# Patient Record
Sex: MALE | Race: NATIVE HAWAIIAN OR OTHER PACIFIC ISLANDER | NOT HISPANIC OR LATINO | URBAN - METROPOLITAN AREA
[De-identification: names, ages, dates, MRNs, and addresses within clinical notes are randomized per-mention and may not be internally consistent; named-entity substitution may affect disease eponyms.]

---

## 2024-01-16 ENCOUNTER — EMERGENCY (EMERGENCY)
Facility: HOSPITAL | Age: 24
LOS: 1 days | Discharge: HOME | End: 2024-01-16
Attending: STUDENT IN AN ORGANIZED HEALTH CARE EDUCATION/TRAINING PROGRAM | Admitting: STUDENT IN AN ORGANIZED HEALTH CARE EDUCATION/TRAINING PROGRAM
Payer: COMMERCIAL

## 2024-01-16 VITALS
DIASTOLIC BLOOD PRESSURE: 89 MMHG | SYSTOLIC BLOOD PRESSURE: 153 MMHG | HEART RATE: 97 BPM | WEIGHT: 195.33 LBS | RESPIRATION RATE: 18 BRPM | HEIGHT: 67 IN | TEMPERATURE: 98 F | OXYGEN SATURATION: 97 %

## 2024-01-16 DIAGNOSIS — Z88.6 ALLERGY STATUS TO ANALGESIC AGENT: ICD-10-CM

## 2024-01-16 DIAGNOSIS — H53.8 OTHER VISUAL DISTURBANCES: ICD-10-CM

## 2024-01-16 DIAGNOSIS — H52.209 UNSPECIFIED ASTIGMATISM, UNSPECIFIED EYE: ICD-10-CM

## 2024-01-16 DIAGNOSIS — R51.9 HEADACHE, UNSPECIFIED: ICD-10-CM

## 2024-01-16 DIAGNOSIS — Z20.822 CONTACT WITH AND (SUSPECTED) EXPOSURE TO COVID-19: ICD-10-CM

## 2024-01-16 LAB
ALBUMIN SERPL ELPH-MCNC: 4.6 G/DL — SIGNIFICANT CHANGE UP (ref 3.3–5)
ALP SERPL-CCNC: 79 U/L — SIGNIFICANT CHANGE UP (ref 40–120)
ALT FLD-CCNC: SIGNIFICANT CHANGE UP (ref 10–45)
ANION GAP SERPL CALC-SCNC: 10 MMOL/L — SIGNIFICANT CHANGE UP (ref 5–17)
ANION GAP SERPL CALC-SCNC: 10 MMOL/L — SIGNIFICANT CHANGE UP (ref 5–17)
ANION GAP SERPL CALC-SCNC: 13 MMOL/L — SIGNIFICANT CHANGE UP (ref 5–17)
APPEARANCE UR: CLEAR — SIGNIFICANT CHANGE UP
APTT BLD: 39.9 SEC — HIGH (ref 24.5–35.6)
AST SERPL-CCNC: SIGNIFICANT CHANGE UP (ref 10–40)
BASE EXCESS BLDV CALC-SCNC: 1.2 MMOL/L — SIGNIFICANT CHANGE UP (ref -2–3)
BASOPHILS # BLD AUTO: 0 K/UL — SIGNIFICANT CHANGE UP (ref 0–0.2)
BASOPHILS NFR BLD AUTO: 0 % — SIGNIFICANT CHANGE UP (ref 0–2)
BILIRUB SERPL-MCNC: 0.2 MG/DL — SIGNIFICANT CHANGE UP (ref 0.2–1.2)
BILIRUB UR-MCNC: NEGATIVE — SIGNIFICANT CHANGE UP
BUN SERPL-MCNC: 10 MG/DL — SIGNIFICANT CHANGE UP (ref 7–23)
BUN SERPL-MCNC: 12 MG/DL — SIGNIFICANT CHANGE UP (ref 7–23)
BUN SERPL-MCNC: 9 MG/DL — SIGNIFICANT CHANGE UP (ref 7–23)
CA-I SERPL-SCNC: 1.22 MMOL/L — SIGNIFICANT CHANGE UP (ref 1.15–1.33)
CALCIUM SERPL-MCNC: 8.7 MG/DL — SIGNIFICANT CHANGE UP (ref 8.4–10.5)
CALCIUM SERPL-MCNC: 9.3 MG/DL — SIGNIFICANT CHANGE UP (ref 8.4–10.5)
CALCIUM SERPL-MCNC: 9.5 MG/DL — SIGNIFICANT CHANGE UP (ref 8.4–10.5)
CHLORIDE SERPL-SCNC: 102 MMOL/L — SIGNIFICANT CHANGE UP (ref 96–108)
CHLORIDE SERPL-SCNC: 103 MMOL/L — SIGNIFICANT CHANGE UP (ref 96–108)
CHLORIDE SERPL-SCNC: 104 MMOL/L — SIGNIFICANT CHANGE UP (ref 96–108)
CO2 BLDV-SCNC: 28 MMOL/L — HIGH (ref 22–26)
CO2 SERPL-SCNC: 23 MMOL/L — SIGNIFICANT CHANGE UP (ref 22–31)
CO2 SERPL-SCNC: 23 MMOL/L — SIGNIFICANT CHANGE UP (ref 22–31)
CO2 SERPL-SCNC: 24 MMOL/L — SIGNIFICANT CHANGE UP (ref 22–31)
COLOR SPEC: YELLOW — SIGNIFICANT CHANGE UP
CREAT SERPL-MCNC: 0.84 MG/DL — SIGNIFICANT CHANGE UP (ref 0.5–1.3)
CREAT SERPL-MCNC: 0.87 MG/DL — SIGNIFICANT CHANGE UP (ref 0.5–1.3)
CREAT SERPL-MCNC: 0.89 MG/DL — SIGNIFICANT CHANGE UP (ref 0.5–1.3)
DIFF PNL FLD: NEGATIVE — SIGNIFICANT CHANGE UP
EGFR: 121 ML/MIN/1.73M2 — SIGNIFICANT CHANGE UP
EGFR: 122 ML/MIN/1.73M2 — SIGNIFICANT CHANGE UP
EGFR: 123 ML/MIN/1.73M2 — SIGNIFICANT CHANGE UP
EOSINOPHIL # BLD AUTO: 0.66 K/UL — HIGH (ref 0–0.5)
EOSINOPHIL NFR BLD AUTO: 4.8 % — SIGNIFICANT CHANGE UP (ref 0–6)
GAS PNL BLDV: 137 MMOL/L — SIGNIFICANT CHANGE UP (ref 136–145)
GAS PNL BLDV: SIGNIFICANT CHANGE UP
GAS PNL BLDV: SIGNIFICANT CHANGE UP
GLUCOSE SERPL-MCNC: 101 MG/DL — HIGH (ref 70–99)
GLUCOSE SERPL-MCNC: 103 MG/DL — HIGH (ref 70–99)
GLUCOSE SERPL-MCNC: 93 MG/DL — SIGNIFICANT CHANGE UP (ref 70–99)
GLUCOSE UR QL: NEGATIVE MG/DL — SIGNIFICANT CHANGE UP
HCO3 BLDV-SCNC: 27 MMOL/L — SIGNIFICANT CHANGE UP (ref 22–29)
HCT VFR BLD CALC: 42.6 % — SIGNIFICANT CHANGE UP (ref 39–50)
HGB BLD-MCNC: 15.2 G/DL — SIGNIFICANT CHANGE UP (ref 13–17)
INR BLD: 0.94 — SIGNIFICANT CHANGE UP (ref 0.85–1.18)
KETONES UR-MCNC: NEGATIVE MG/DL — SIGNIFICANT CHANGE UP
LEUKOCYTE ESTERASE UR-ACNC: NEGATIVE — SIGNIFICANT CHANGE UP
LYMPHOCYTES # BLD AUTO: 33 % — SIGNIFICANT CHANGE UP (ref 13–44)
LYMPHOCYTES # BLD AUTO: 4.55 K/UL — HIGH (ref 1–3.3)
MANUAL SMEAR VERIFICATION: SIGNIFICANT CHANGE UP
MCHC RBC-ENTMCNC: 29.9 PG — SIGNIFICANT CHANGE UP (ref 27–34)
MCHC RBC-ENTMCNC: 35.7 GM/DL — SIGNIFICANT CHANGE UP (ref 32–36)
MCV RBC AUTO: 83.7 FL — SIGNIFICANT CHANGE UP (ref 80–100)
MONOCYTES # BLD AUTO: 0 K/UL — SIGNIFICANT CHANGE UP (ref 0–0.9)
MONOCYTES NFR BLD AUTO: 0 % — LOW (ref 2–14)
NEUTROPHILS # BLD AUTO: 7.91 K/UL — HIGH (ref 1.8–7.4)
NEUTROPHILS NFR BLD AUTO: 56.3 % — SIGNIFICANT CHANGE UP (ref 43–77)
NEUTS BAND # BLD: 1 % — SIGNIFICANT CHANGE UP (ref 0–8)
NITRITE UR-MCNC: NEGATIVE — SIGNIFICANT CHANGE UP
OVALOCYTES BLD QL SMEAR: SLIGHT — SIGNIFICANT CHANGE UP
PCO2 BLDV: 44 MMHG — SIGNIFICANT CHANGE UP (ref 42–55)
PH BLDV: 7.39 — SIGNIFICANT CHANGE UP (ref 7.32–7.43)
PH UR: 7.5 — SIGNIFICANT CHANGE UP (ref 5–8)
PLAT MORPH BLD: SIGNIFICANT CHANGE UP
PLATELET # BLD AUTO: 325 K/UL — SIGNIFICANT CHANGE UP (ref 150–400)
PO2 BLDV: 39 MMHG — SIGNIFICANT CHANGE UP (ref 25–45)
POTASSIUM BLDV-SCNC: 3.9 MMOL/L — SIGNIFICANT CHANGE UP (ref 3.5–5.1)
POTASSIUM SERPL-MCNC: SIGNIFICANT CHANGE UP (ref 3.5–5.3)
POTASSIUM SERPL-SCNC: SIGNIFICANT CHANGE UP (ref 3.5–5.3)
PROT SERPL-MCNC: 7.2 G/DL — SIGNIFICANT CHANGE UP (ref 6–8.3)
PROT UR-MCNC: NEGATIVE MG/DL — SIGNIFICANT CHANGE UP
PROTHROM AB SERPL-ACNC: 10.7 SEC — SIGNIFICANT CHANGE UP (ref 9.5–13)
RAPID RVP RESULT: SIGNIFICANT CHANGE UP
RBC # BLD: 5.09 M/UL — SIGNIFICANT CHANGE UP (ref 4.2–5.8)
RBC # FLD: 11.9 % — SIGNIFICANT CHANGE UP (ref 10.3–14.5)
RBC BLD AUTO: ABNORMAL
SAO2 % BLDV: 61.2 % — LOW (ref 67–88)
SARS-COV-2 RNA SPEC QL NAA+PROBE: SIGNIFICANT CHANGE UP
SMUDGE CELLS # BLD: PRESENT — SIGNIFICANT CHANGE UP
SODIUM SERPL-SCNC: 136 MMOL/L — SIGNIFICANT CHANGE UP (ref 135–145)
SODIUM SERPL-SCNC: 137 MMOL/L — SIGNIFICANT CHANGE UP (ref 135–145)
SODIUM SERPL-SCNC: 139 MMOL/L — SIGNIFICANT CHANGE UP (ref 135–145)
SP GR SPEC: >1.03 — HIGH (ref 1–1.03)
SPHEROCYTES BLD QL SMEAR: SLIGHT — SIGNIFICANT CHANGE UP
TROPONIN T, HIGH SENSITIVITY RESULT: <6 NG/L — SIGNIFICANT CHANGE UP (ref 0–51)
UROBILINOGEN FLD QL: 0.2 MG/DL — SIGNIFICANT CHANGE UP (ref 0.2–1)
VARIANT LYMPHS # BLD: 4.9 % — SIGNIFICANT CHANGE UP (ref 0–6)
WBC # BLD: 13.8 K/UL — HIGH (ref 3.8–10.5)
WBC # FLD AUTO: 13.8 K/UL — HIGH (ref 3.8–10.5)

## 2024-01-16 PROCEDURE — 93010 ELECTROCARDIOGRAM REPORT: CPT

## 2024-01-16 PROCEDURE — 71045 X-RAY EXAM CHEST 1 VIEW: CPT | Mod: 26

## 2024-01-16 PROCEDURE — 0042T: CPT | Mod: MA

## 2024-01-16 PROCEDURE — 99291 CRITICAL CARE FIRST HOUR: CPT

## 2024-01-16 PROCEDURE — 70496 CT ANGIOGRAPHY HEAD: CPT | Mod: 26,MA

## 2024-01-16 PROCEDURE — 70498 CT ANGIOGRAPHY NECK: CPT | Mod: 26,MA

## 2024-01-16 PROCEDURE — 70450 CT HEAD/BRAIN W/O DYE: CPT | Mod: 26,MA,59

## 2024-01-16 NOTE — ED PROVIDER NOTE - NS ED ROS FT
positive: HA, L eye vision change  negative: f/c/congestion/cough/cp/sob/abdominal pain/n/v/d/dysuria/hematuria/leg edema/rash positive: HA, L eye vision change  negative: f/c/congestion/cough/cp/sob/abdominal pain/n/v/d/dysuria/hematuria/leg edema/rash/ataxia/dysarthria/focal numbness or weakness/facial droop

## 2024-01-16 NOTE — ED PROVIDER NOTE - PHYSICAL EXAMINATION
GENERAL:  Awake, alert and in NAD, non-toxic appearing  ENMT: Airway patent  EYES: conjunctiva clear  VA OD: 20/30 OS: 20/30  peripheral vision with no deficits both eyes  no nystagmus bilat   CARDIAC: Regular rate, regular rhythm.  Heart sounds S1, S2, no S3, S4. No murmurs, rubs or gallops.  RESPIRATORY: Breath sounds clear and equal in bilateral anterior lung fields, no wheezes/ronchi/crackles/stridor; pt breathing and speaking comfortably with no increased WOB, no accessory mm. use, no intercostal retractions, no nasal flaring  GI: abdomen soft, non-distended, non-tender, no rebound or guarding.  SKIN: warm and dry, no rashes  PSYCH: awake, alert, calm and cooperative  EXTREMITIES: no ble edema  NEURO: gcs 15  NEURO: pupils 3 mm, PERRL, EOMI (CN III, IV, VI), facial sensation intact to light touch in all 3 divisions bilat (CN V), face is symmetric with normal eye closure, eye opening, and smile (CN VII), hearing is normal to rubbing fingers (CN VII), palate elevates symmetrically, phonation is normal (CN IX, X),  shoulder shrug intact bilat (CN XI), tongue is midline with nl movements and no atrophy (CN XII),  speech is clear; 5/5 motor strength BUE and BLE: deltoids, biceps, triceps, wrist flexors/extensors, hand , hip flexors, knee flexors/extensors, plantar/dorsiflexors, hallux flexors/extensors; sensation intact to light touch BUE and BLE: C5-T1 and L3-S1 gait wnl

## 2024-01-16 NOTE — ED PROVIDER NOTE - PATIENT PORTAL LINK FT
You can access the FollowMyHealth Patient Portal offered by [facility] by registering at the following website: [email or web address]. By joining [employer]’s FollowMyHealth portal, you will also be able to view your health information using other applications (apps) compatible with our system.

## 2024-01-16 NOTE — ED PROVIDER NOTE - NSFOLLOWUPINSTRUCTIONS_ED_ALL_ED_FT
Please reach out to Annelise Valdez ([facility] ED clinical referral coordinator) to assist you with your follow-up appointment with an ophthalmologist and a neurologist.    Monday - Friday 11am-7pm  [phone]  [email or web address]     1. You were seen for vision changes. A copy of any of your resulted labs, imaging, and findings have been provided to you. Make sure to view any test results that may not have yet resulted at the time of your discharge by creating a FollowMyHealth account at: [email or web address] to sign up for FollowMyHealth. Please review all of your lab, imaging, and all other results in their entirety with your primary care doctor.   2. Continue to take your home medications as prescribed.   3. Follow up with an ophthalmologist and a neurologist, and your primary care doctor within 48 hours to assess the symptoms you were seen for in the emergency department and to review all results from your visit. If you don't have a doctor, call [phone] to make an appointment.  4. Return immediately to the emergency department for new, persistent, or worsening symptoms or signs. Return immediately to the emergency department if you have chest pain, shortness of breath, loss of consciousness, difficulty seeing, difficulty walking, headache, vomiting, or facial droop, or difficulty controlling or feeling your arms or legs..  5. For your for health, you should make healthy food choices and be physically active. Also, you should not smoke or use drugs, and you should not drink alcohol in excess. Please visit [email or web address] for resources and more information.

## 2024-01-16 NOTE — ED PROVIDER NOTE - OBJECTIVE STATEMENT
24M no PMH/PSH/home meds p/w episode of L eye vision changes that occurred around 45 minutes PTA (around 19:22 today), LKN was 19:21 today with associated gradual onset mild headache. pt describes an "aura" or light around his R eye and some blurry vision that has mostly improved. pt wears glasses, no contacts. no trauma. no hx of simliar sx. denies ataxia/facial droop/dysarthria/focal numbness or weakness.

## 2024-01-16 NOTE — CONSULT NOTE ADULT - ASSESSMENT
24y Male with PMHx of trace tricuspid regurgitation, astigmatism, long COVID, palpitations (has not experienced them since 2021), and anxiety, presenting to [facility] ED after he noticed while looking at his computer screen a "black blob with a light halo" in the medial visual field of his left eye. Patient reports the spot looked irregular, was not moving, an looked like "what you see when you stare at the sun and then look away". On arrival to ED, patient reports the spot was no longer present and he was having a mild headache. NIHSS 0. NCHCT, CTA head and neck, and CT perfusion scans negative. Patient states he has a new computer screen that he has not been using frequently for the last few weeks until today. After the spot subsided, he felt as though his vision was still blurry in his left eye, but is unsure.     - no further inpatient stroke work up indicated at this time  - please have patient follow up with us as an outpatient by calling [phone]  - patient should also follow up with his ophthalmologist this week   - would recommend see his PCP for an echocardiogram with bubble study within the next week to rule out possible PFO    Case discussed with Dr. Gomez who will discuss with Dr. Vega 24y Male with PMHx of trace tricuspid regurgitation, astigmatism, long COVID, palpitations (has not experienced them since 2021), and anxiety, presenting to [facility] ED after he noticed while looking at his computer screen a "black blob with a light halo" in the medial visual field of his left eye. Patient reports the spot looked irregular, was not moving, an looked like "what you see when you stare at the sun and then look away". On arrival to ED, patient reports the spot was no longer present and he was having a mild headache. NIHSS 0. NCHCT, CTA head and neck, and CT perfusion scans negative. Patient states he has a new computer screen that he has not been using frequently for the last few weeks until today. After the spot subsided, he felt as though his vision was still blurry in his left eye, but is unsure.     - no further inpatient stroke work up indicated at this time  - please have patient follow up with us as an outpatient by calling [phone]  - patient should also follow up with his ophthalmologist this week   - would recommend patient see his PCP for an echocardiogram with bubble study within the next week to rule out possible PFO    Case discussed with Dr. Gomez who will discuss with Dr. Vega

## 2024-01-16 NOTE — ED ADULT NURSE NOTE - NSFALLUNIVINTERV_ED_ALL_ED
Bed/Stretcher in lowest position, wheels locked, appropriate side rails in place/Call bell, personal items and telephone in reach/Instruct patient to call for assistance before getting out of bed/chair/stretcher/Non-slip footwear applied when patient is off stretcher/[place] to call system/Physically safe environment - no spills, clutter or unnecessary equipment/Purposeful proactive rounding/Room/bathroom lighting operational, light cord in reach

## 2024-01-16 NOTE — ED ADULT TRIAGE NOTE - CHIEF COMPLAINT QUOTE
"I have had vision loss in my left eye since around 6:50PM tonight. I was staring at my computer screen and noticed worsening vision in my left eye. I think it got a little better, but it is not back to normal. I also had a little bit of a headache." Denies numbness/tingling, weakness. PERRL.

## 2024-01-16 NOTE — ED PROVIDER NOTE - CLINICAL SUMMARY MEDICAL DECISION MAKING FREE TEXT BOX
24M no PMH/PSH/home meds p/w episode of L eye vision changes that occurred around 45 minutes PTA (around 19:22 today), LKN was 19:21 today with associated gradual onset mild headache. On exam, VS wnl, non-focal neuro exam, normal visual acuity.    ddx: migraine with aura vs tia vs cva vs retinal detachment    Plan:  - code stroke called at initial eval as pt had vision changes within 24 hours, now resolved  - ekg: nsr no chano/std/twi  - labs: k hemolyzed is wnl on vbg, labs otherwise wnl  - CTH, CTA head and neck, CTP are all unremarkable  - POCUS ocular bilat shows no e/o lens or retinal detachment or vitreous hemorrhage or detachment   - stroke team evaluated pt, state no concern for cva at this time, may d/c neuro check and pt does not need admission or mri, may f/u outpatient with ophthalmology and neurology  -

## 2024-01-16 NOTE — ED ADULT NURSE NOTE - BREATH SOUNDS, MLM
Clear Eye Protection Verbiage: Before proceeding with the stage, a plastic scleral shield was inserted. The globe was anesthetized with a few drops of 1% lidocaine with 1:100,000 epinephrine. Then, an appropriate sized scleral shield was chosen and coated with lacrilube ointment. The shield was gently inserted and left in place for the duration of each stage. After the stage was completed, the shield was gently removed.

## 2024-01-16 NOTE — ED ADULT NURSE NOTE - OBJECTIVE STATEMENT
25 yo M no pmhx presents to ED co acute onset L eye vision loss 45 min PTA a/w mild HA after looking at computer screen all day. Vision improving without inervention. Denies numbness, tingling, unilateral weakness, difficulty speaking.

## 2024-01-16 NOTE — CONSULT NOTE ADULT - SUBJECTIVE AND OBJECTIVE BOX
**STROKE CODE CONSULT NOTE**    Last known well time/Time of onset of symptoms:    HPI: 24y Male with PMHx of     T(C): 36.1 (01-16-24 @ 20:27), Max: 36.6 (01-16-24 @ 19:22)  HR: 86 (01-16-24 @ 20:27) (86 - 97)  BP: 125/67 (01-16-24 @ 20:27) (125/67 - 153/89)  RR: 19 (01-16-24 @ 20:27) (18 - 19)  SpO2: 98% (01-16-24 @ 20:27) (97% - 98%)    PAST MEDICAL & SURGICAL HISTORY:      FAMILY HISTORY:      SOCIAL HISTORY:   Patient lives with *** at ***.   Smoking status:  Drinking:  Drug Use:     ROS: ***  Constitutional: No fever, weight loss or fatigue  Eyes: No eye pain, visual disturbances, or discharge  ENMT:  No difficulty hearing, tinnitus; No sinus or throat pain  Neck: No pain or stiffness  Respiratory: No cough, wheezing, chills or hemoptysis  Cardiovascular: No chest pain, palpitations, shortness of breath, or leg swelling  Gastrointestinal: No abdominal pain. No nausea, vomiting or hematemesis; No diarrhea or constipation. Nohematochezia.  Genitourinary: No dysuria, frequency, hematuria or incontinence  Neurological: As per HPI  Skin: No itching, burning, rashes or lesions   Endocrine: No heat or cold intolerance; No hair loss  Musculoskeletal: No joint pain or swelling; No muscle, back or extremity pain  Heme/Lymph: No easy bruising or bleeding gums    MEDICATIONS  (STANDING):    MEDICATIONS  (PRN):    Allergies    ibuprofen (Stomach Upset)    Intolerances      Vital Signs Last 24 Hrs  T(C): 36.1 (16 Jan 2024 20:27), Max: 36.6 (16 Jan 2024 19:22)  T(F): 97 (16 Jan 2024 20:27), Max: 97.9 (16 Jan 2024 19:22)  HR: 86 (16 Jan 2024 20:27) (86 - 97)  BP: 125/67 (16 Jan 2024 20:27) (125/67 - 153/89)  BP(mean): --  RR: 19 (16 Jan 2024 20:27) (18 - 19)  SpO2: 98% (16 Jan 2024 20:27) (97% - 98%)    Parameters below as of 16 Jan 2024 20:27  Patient On (Oxygen Delivery Method): room air        Physical exam:  Constitutional: No acute distress, conversant  Eyes: Anicteric sclerae, moist conjunctivae, see below for CNs  Neck: trachea midline, FROM, supple, no thyromegaly or lymphadenopathy  Cardiovascular: Regular rate and rhythm, no murmurs, rubs, or gallops. No carotid bruits.   Pulmonary: Anterior breath sounds clear bilaterally, no crackles or wheezing. No use of accessory muscles  GI: Abdomen soft, non-distended, non-tender  Extremities: Radial and DP pulses +2, no edema    Neurologic:  -Mental status: Awake, alert, oriented to person, place, and time. Speech is fluent with intact naming, repetition, and comprehension, no dysarthria. Recent and remote memory intact. Follows commands. Attention/concentration intact. Fund of knowledge appropriate.  -Cranial nerves:   II: Visual fields are full to confrontation.  III, IV, VI: Extraocular movements are intact without nystagmus. Pupils equally round and reactive to light  V:  Facial sensation V1-V3 equal and intact   VII: Face is symmetric with normal eye closure and smile  VIII: Hearing is bilaterally intact to finger rub  IX, X: Uvula is midline and soft palate rises symmetrically  XI: Head turning and shoulder shrug are intact.  XII: Tongue protrudes midline  Motor: Normal bulk and tone. No pronator drift. Strength bilateral upper extremity 5/5, bilateral lower extremities 5/5.  Rapid alternating movements intact and symmetric  Sensation: Intact to light touch bilaterally. No neglect or extinction on double simultaneous testing.  Coordination: No dysmetria on finger-to-nose and heel-to-shin bilaterally  Reflexes: Downgoing toes bilaterally   Gait: Narrow gait and steady    NIHSS: **** ASPECT Score: ***** ICH Score: ****** (GCS)    Fingerstick Blood Glucose: CAPILLARY BLOOD GLUCOSE      POCT Blood Glucose.: 95 mg/dL (16 Jan 2024 19:23)    LABS:                        15.2   13.80 )-----------( 325      ( 16 Jan 2024 20:09 )             42.6     01-16    139  |  102  |  12  ----------------------------<  103<H>  See Note   |  24  |  0.87    Ca    9.3      16 Jan 2024 20:09    TPro  7.2  /  Alb  4.6  /  TBili  0.2  /  DBili  x   /  AST  See Note  /  ALT  See Note  /  AlkPhos  79  01-16    PT/INR - ( 16 Jan 2024 20:09 )   PT: 10.7 sec;   INR: 0.94          PTT - ( 16 Jan 2024 20:09 )  PTT:39.9 sec      Urinalysis Basic - ( 16 Jan 2024 20:09 )    Color: x / Appearance: x / SG: x / pH: x  Gluc: 103 mg/dL / Ketone: x  / Bili: x / Urobili: x   Blood: x / Protein: x / Nitrite: x   Leuk Esterase: x / RBC: x / WBC x   Sq Epi: x / Non Sq Epi: x / Bacteria: x        RADIOLOGY & ADDITIONAL STUDIES:      -----------------------------------------------------------------------------------------------------------------  IV-tPA (Y/N):    ***                              Bolus time:    Alteplase Dose Verification w/ RN:  Reason IV-tPA not given: ***    **STROKE CODE CONSULT NOTE**    Time of onset of symptoms: 18:50    HPI: 24y Male with PMHx of trace tricuspid regurgitation, astigmatism. long COVID, palpitations (has not experienced them since 2021), and anxiety, presenting to [facility] ED after he noticed while looking at his computer screen a "black blob with a light halo" in the medial visual field of his left eye. Patient reports the spot looked irregular, was not moving, an looked like "what you see when you stare at the sun and then look away". On arrival to ED, patient reports the spot was no longer present and he was having a mild headache. NIHSS 0. NCHCT, CTA head and neck, and CT perfusion scans negative. Patient states he has a new computer screen that he has not been using frequently for the last few weeks until today. After the spot subsided, he felt as though his vision was still blurry in his left eye, but is unsure.     T(C): 36.1 (01-16-24 @ 20:27), Max: 36.6 (01-16-24 @ 19:22)  HR: 86 (01-16-24 @ 20:27) (86 - 97)  BP: 125/67 (01-16-24 @ 20:27) (125/67 - 153/89)  RR: 19 (01-16-24 @ 20:27) (18 - 19)  SpO2: 98% (01-16-24 @ 20:27) (97% - 98%)    PAST MEDICAL & SURGICAL HISTORY:      FAMILY HISTORY:      SOCIAL HISTORY:   Patient lives with self  Smoking status: denies  Drinking: occasionally    ROS:   Constitutional: No fever, weight loss or fatigue  Eyes: No eye pain, visual disturbances, or discharge  ENMT:  No difficulty hearing, tinnitus; No sinus or throat pain  Neck: No pain or stiffness  Respiratory: No cough, wheezing, chills or hemoptysis  Cardiovascular: No chest pain, palpitations, shortness of breath, or leg swelling  Gastrointestinal: No abdominal pain. No nausea, vomiting or hematemesis; No diarrhea or constipation.   Genitourinary: No dysuria, frequency, hematuria or incontinence  Neurological: As per HPI  Skin: No itching, burning, rashes or lesions   Endocrine: No heat or cold intolerance; No hair loss  Musculoskeletal: No joint pain or swelling; No muscle, back or extremity pain  Heme/Lymph: No easy bruising or bleeding gums    MEDICATIONS  (STANDING):    MEDICATIONS  (PRN):    Allergies    ibuprofen (Stomach Upset)    Intolerances      Vital Signs Last 24 Hrs  T(C): 36.1 (16 Jan 2024 20:27), Max: 36.6 (16 Jan 2024 19:22)  T(F): 97 (16 Jan 2024 20:27), Max: 97.9 (16 Jan 2024 19:22)  HR: 86 (16 Jan 2024 20:27) (86 - 97)  BP: 125/67 (16 Jan 2024 20:27) (125/67 - 153/89)  BP(mean): --  RR: 19 (16 Jan 2024 20:27) (18 - 19)  SpO2: 98% (16 Jan 2024 20:27) (97% - 98%)    Parameters below as of 16 Jan 2024 20:27  Patient On (Oxygen Delivery Method): room air        Physical exam:  Constitutional: No acute distress, conversant  Eyes: Anicteric sclerae, moist conjunctivae, see below for CNs  Neck: trachea midline  Cardiovascular: Regular rate and rhythm  Pulmonary: No use of accessory muscles  GI: Abdomen soft, non-distended, non-tender  Extremities:  no edema    Neurologic:  -Mental status: Awake, alert, oriented to person, place, and time. Speech is fluent with intact naming, repetition, and comprehension, no dysarthria. Recent and remote memory intact. Follows commands. Attention/concentration intact. Fund of knowledge appropriate.  -Cranial nerves:   II: Visual fields are full to confrontation.  III, IV, VI: Extraocular movements are intact without nystagmus. Pupils equally round and reactive to light  V:  Facial sensation V1-V3 equal and intact   VII: Face is symmetric with normal eye closure and smile  Motor: Normal bulk and tone. No pronator drift. Strength bilateral upper extremity 5/5, bilateral lower extremities 5/5.  Rapid alternating movements intact and symmetric  Sensation: Intact to light touch bilaterally. No neglect or extinction on double simultaneous testing.  Coordination: No dysmetria on finger-to-nose and heel-to-shin bilaterally  Gait: Narrow gait and steady    NIHSS: 0    Fingerstick Blood Glucose: CAPILLARY BLOOD GLUCOSE      POCT Blood Glucose.: 95 mg/dL (16 Jan 2024 19:23)    LABS:                        15.2   13.80 )-----------( 325      ( 16 Jan 2024 20:09 )             42.6     01-16    139  |  102  |  12  ----------------------------<  103<H>  See Note   |  24  |  0.87    Ca    9.3      16 Jan 2024 20:09    TPro  7.2  /  Alb  4.6  /  TBili  0.2  /  DBili  x   /  AST  See Note  /  ALT  See Note  /  AlkPhos  79  01-16    PT/INR - ( 16 Jan 2024 20:09 )   PT: 10.7 sec;   INR: 0.94          PTT - ( 16 Jan 2024 20:09 )  PTT:39.9 sec      Urinalysis Basic - ( 16 Jan 2024 20:09 )    Color: x / Appearance: x / SG: x / pH: x  Gluc: 103 mg/dL / Ketone: x  / Bili: x / Urobili: x   Blood: x / Protein: x / Nitrite: x   Leuk Esterase: x / RBC: x / WBC x   Sq Epi: x / Non Sq Epi: x / Bacteria: x        RADIOLOGY & ADDITIONAL STUDIES:    < from: CT Brain Stroke Protocol (01.16.24 @ 19:54) >  IMPRESSION:  Unremarkable noncontrast CT of the brain.    < end of copied text >    < from: CT Angio Brain Stroke Protocol  w/ IV Cont (01.16.24 @ 20:03) >    IMPRESSION:    CT PERFUSION: No territorial deficit reported.    CTA INTRACRANIAL: Unremarkable. No arterial steno-occlusive disease or   aneurysm.    CTA EXTRACRANIAL: Unremarkable. No arterial steno-occlusive disease or   dissection.    < end of copied text >    -----------------------------------------------------------------------------------------------------------------  IV-tPA (Y/N):   N                               Reason IV-tPA not given: NIHSS 0    **STROKE CODE CONSULT NOTE**    Time of onset of symptoms: 18:50    HPI: 24y Male with PMHx of trace tricuspid regurgitation, astigmatism. long COVID, palpitations (has not experienced them since 2021), and anxiety, presenting to [facility] ED after he noticed while looking at his computer screen a "black blob with a light halo" in the medial visual field of his left eye. Patient reports the spot looked irregular, was not moving, an looked like "what you see when you stare at the sun and then look away". On arrival to ED, patient reports the spot was no longer present and he was having a mild headache. NIHSS 0. NCHCT, CTA head and neck, and CT perfusion scans negative. Patient states he has a new computer screen that he has not been using frequently for the last few weeks until today. After the spot subsided, he felt as though his vision was still blurry in his left eye, but is unsure. Patient denies a dark curtain moving across his vision.     T(C): 36.1 (01-16-24 @ 20:27), Max: 36.6 (01-16-24 @ 19:22)  HR: 86 (01-16-24 @ 20:27) (86 - 97)  BP: 125/67 (01-16-24 @ 20:27) (125/67 - 153/89)  RR: 19 (01-16-24 @ 20:27) (18 - 19)  SpO2: 98% (01-16-24 @ 20:27) (97% - 98%)    PAST MEDICAL & SURGICAL HISTORY:      FAMILY HISTORY:      SOCIAL HISTORY:   Patient lives with self  Smoking status: denies  Drinking: occasionally    ROS:   Constitutional: No fever, weight loss or fatigue  Eyes: No eye pain, visual disturbances, or discharge  ENMT:  No difficulty hearing, tinnitus; No sinus or throat pain  Neck: No pain or stiffness  Respiratory: No cough, wheezing, chills or hemoptysis  Cardiovascular: No chest pain, palpitations, shortness of breath, or leg swelling  Gastrointestinal: No abdominal pain. No nausea, vomiting or hematemesis; No diarrhea or constipation.   Genitourinary: No dysuria, frequency, hematuria or incontinence  Neurological: As per HPI  Skin: No itching, burning, rashes or lesions   Endocrine: No heat or cold intolerance; No hair loss  Musculoskeletal: No joint pain or swelling; No muscle, back or extremity pain  Heme/Lymph: No easy bruising or bleeding gums    MEDICATIONS  (STANDING):    MEDICATIONS  (PRN):    Allergies    ibuprofen (Stomach Upset)    Intolerances      Vital Signs Last 24 Hrs  T(C): 36.1 (16 Jan 2024 20:27), Max: 36.6 (16 Jan 2024 19:22)  T(F): 97 (16 Jan 2024 20:27), Max: 97.9 (16 Jan 2024 19:22)  HR: 86 (16 Jan 2024 20:27) (86 - 97)  BP: 125/67 (16 Jan 2024 20:27) (125/67 - 153/89)  BP(mean): --  RR: 19 (16 Jan 2024 20:27) (18 - 19)  SpO2: 98% (16 Jan 2024 20:27) (97% - 98%)    Parameters below as of 16 Jan 2024 20:27  Patient On (Oxygen Delivery Method): room air        Physical exam:  Constitutional: No acute distress, conversant  Eyes: Anicteric sclerae, moist conjunctivae, see below for CNs  Neck: trachea midline  Cardiovascular: Regular rate and rhythm  Pulmonary: No use of accessory muscles  GI: Abdomen soft, non-distended, non-tender  Extremities:  no edema    Neurologic:  -Mental status: Awake, alert, oriented to person, place, and time. Speech is fluent with intact naming, repetition, and comprehension, no dysarthria. Recent and remote memory intact. Follows commands. Attention/concentration intact. Fund of knowledge appropriate.  -Cranial nerves:   II: Visual fields are full to confrontation.  III, IV, VI: Extraocular movements are intact without nystagmus. Pupils equally round and reactive to light  V:  Facial sensation V1-V3 equal and intact   VII: Face is symmetric with normal eye closure and smile  Motor: Normal bulk and tone. No pronator drift. Strength bilateral upper extremity 5/5, bilateral lower extremities 5/5.  Rapid alternating movements intact and symmetric  Sensation: Intact to light touch bilaterally. No neglect or extinction on double simultaneous testing.  Coordination: No dysmetria on finger-to-nose and heel-to-shin bilaterally  Gait: Narrow gait and steady    NIHSS: 0    Fingerstick Blood Glucose: CAPILLARY BLOOD GLUCOSE      POCT Blood Glucose.: 95 mg/dL (16 Jan 2024 19:23)    LABS:                        15.2   13.80 )-----------( 325      ( 16 Jan 2024 20:09 )             42.6     01-16    139  |  102  |  12  ----------------------------<  103<H>  See Note   |  24  |  0.87    Ca    9.3      16 Jan 2024 20:09    TPro  7.2  /  Alb  4.6  /  TBili  0.2  /  DBili  x   /  AST  See Note  /  ALT  See Note  /  AlkPhos  79  01-16    PT/INR - ( 16 Jan 2024 20:09 )   PT: 10.7 sec;   INR: 0.94          PTT - ( 16 Jan 2024 20:09 )  PTT:39.9 sec      Urinalysis Basic - ( 16 Jan 2024 20:09 )    Color: x / Appearance: x / SG: x / pH: x  Gluc: 103 mg/dL / Ketone: x  / Bili: x / Urobili: x   Blood: x / Protein: x / Nitrite: x   Leuk Esterase: x / RBC: x / WBC x   Sq Epi: x / Non Sq Epi: x / Bacteria: x        RADIOLOGY & ADDITIONAL STUDIES:    < from: CT Brain Stroke Protocol (01.16.24 @ 19:54) >  IMPRESSION:  Unremarkable noncontrast CT of the brain.    < end of copied text >    < from: CT Angio Brain Stroke Protocol  w/ IV Cont (01.16.24 @ 20:03) >    IMPRESSION:    CT PERFUSION: No territorial deficit reported.    CTA INTRACRANIAL: Unremarkable. No arterial steno-occlusive disease or   aneurysm.    CTA EXTRACRANIAL: Unremarkable. No arterial steno-occlusive disease or   dissection.    < end of copied text >    -----------------------------------------------------------------------------------------------------------------  IV-tPA (Y/N):   N                               Reason IV-tPA not given: NIHSS 0

## 2024-01-17 VITALS
RESPIRATION RATE: 16 BRPM | SYSTOLIC BLOOD PRESSURE: 122 MMHG | DIASTOLIC BLOOD PRESSURE: 71 MMHG | OXYGEN SATURATION: 99 % | HEART RATE: 80 BPM

## 2024-01-17 PROCEDURE — 84484 ASSAY OF TROPONIN QUANT: CPT

## 2024-01-17 PROCEDURE — 70498 CT ANGIOGRAPHY NECK: CPT | Mod: MA

## 2024-01-17 PROCEDURE — 70450 CT HEAD/BRAIN W/O DYE: CPT | Mod: MA

## 2024-01-17 PROCEDURE — 0042T: CPT | Mod: MA

## 2024-01-17 PROCEDURE — 36415 COLL VENOUS BLD VENIPUNCTURE: CPT

## 2024-01-17 PROCEDURE — 76512 OPH US DX B-SCAN: CPT

## 2024-01-17 PROCEDURE — 93005 ELECTROCARDIOGRAM TRACING: CPT

## 2024-01-17 PROCEDURE — 80053 COMPREHEN METABOLIC PANEL: CPT

## 2024-01-17 PROCEDURE — 85730 THROMBOPLASTIN TIME PARTIAL: CPT

## 2024-01-17 PROCEDURE — 84295 ASSAY OF SERUM SODIUM: CPT

## 2024-01-17 PROCEDURE — 80048 BASIC METABOLIC PNL TOTAL CA: CPT

## 2024-01-17 PROCEDURE — 0225U NFCT DS DNA&RNA 21 SARSCOV2: CPT

## 2024-01-17 PROCEDURE — 76512 OPH US DX B-SCAN: CPT | Mod: 26,LT

## 2024-01-17 PROCEDURE — 82330 ASSAY OF CALCIUM: CPT

## 2024-01-17 PROCEDURE — 85025 COMPLETE CBC W/AUTO DIFF WBC: CPT

## 2024-01-17 PROCEDURE — 82962 GLUCOSE BLOOD TEST: CPT

## 2024-01-17 PROCEDURE — 99291 CRITICAL CARE FIRST HOUR: CPT | Mod: 25

## 2024-01-17 PROCEDURE — 81003 URINALYSIS AUTO W/O SCOPE: CPT

## 2024-01-17 PROCEDURE — 85610 PROTHROMBIN TIME: CPT

## 2024-01-17 PROCEDURE — 71045 X-RAY EXAM CHEST 1 VIEW: CPT

## 2024-01-17 PROCEDURE — 84132 ASSAY OF SERUM POTASSIUM: CPT

## 2024-01-17 PROCEDURE — 82803 BLOOD GASES ANY COMBINATION: CPT

## 2024-01-17 PROCEDURE — 70496 CT ANGIOGRAPHY HEAD: CPT | Mod: MA
